# Patient Record
Sex: FEMALE | Race: OTHER | URBAN - METROPOLITAN AREA
[De-identification: names, ages, dates, MRNs, and addresses within clinical notes are randomized per-mention and may not be internally consistent; named-entity substitution may affect disease eponyms.]

---

## 2020-02-20 ENCOUNTER — OFFICE VISIT (OUTPATIENT)
Dept: FAMILY MEDICINE CLINIC | Age: 40
End: 2020-02-20

## 2020-02-20 ENCOUNTER — HOSPITAL ENCOUNTER (OUTPATIENT)
Dept: LAB | Age: 40
Discharge: HOME | End: 2020-02-20

## 2020-02-20 VITALS
DIASTOLIC BLOOD PRESSURE: 72 MMHG | OXYGEN SATURATION: 100 % | BODY MASS INDEX: 23.59 KG/M2 | HEART RATE: 65 BPM | WEIGHT: 128.2 LBS | TEMPERATURE: 98.6 F | HEIGHT: 62 IN | SYSTOLIC BLOOD PRESSURE: 121 MMHG

## 2020-02-20 DIAGNOSIS — K29.70 GASTRITIS WITHOUT BLEEDING, UNSPECIFIED CHRONICITY, UNSPECIFIED GASTRITIS TYPE: ICD-10-CM

## 2020-02-20 DIAGNOSIS — L50.9 URTICARIA: ICD-10-CM

## 2020-02-20 DIAGNOSIS — L50.9 URTICARIA: Primary | ICD-10-CM

## 2020-02-20 DIAGNOSIS — Z13.9 ENCOUNTER FOR SCREENING: ICD-10-CM

## 2020-02-20 LAB
ALBUMIN SERPL-MCNC: 4.2 G/DL (ref 3.5–5)
ALBUMIN/GLOB SERPL: 1.3 {RATIO} (ref 1.1–2.2)
ALP SERPL-CCNC: 74 U/L (ref 45–117)
ALT SERPL-CCNC: 16 U/L (ref 12–78)
ANION GAP SERPL CALC-SCNC: 3 MMOL/L (ref 5–15)
AST SERPL-CCNC: 16 U/L (ref 15–37)
BILIRUB SERPL-MCNC: 0.5 MG/DL (ref 0.2–1)
BUN SERPL-MCNC: 11 MG/DL (ref 6–20)
BUN/CREAT SERPL: 18 (ref 12–20)
CALCIUM SERPL-MCNC: 9.2 MG/DL (ref 8.5–10.1)
CHLORIDE SERPL-SCNC: 105 MMOL/L (ref 97–108)
CO2 SERPL-SCNC: 28 MMOL/L (ref 21–32)
CREAT SERPL-MCNC: 0.61 MG/DL (ref 0.55–1.02)
GLOBULIN SER CALC-MCNC: 3.3 G/DL (ref 2–4)
GLUCOSE POC: NORMAL MG/DL
GLUCOSE SERPL-MCNC: 77 MG/DL (ref 65–100)
HGB BLD-MCNC: 12.6 G/DL
POTASSIUM SERPL-SCNC: 3.9 MMOL/L (ref 3.5–5.1)
PROT SERPL-MCNC: 7.5 G/DL (ref 6.4–8.2)
SODIUM SERPL-SCNC: 136 MMOL/L (ref 136–145)

## 2020-02-20 PROCEDURE — 80053 COMPREHEN METABOLIC PANEL: CPT

## 2020-02-20 RX ORDER — HYDROXYZINE PAMOATE 25 MG/1
25 CAPSULE ORAL
Qty: 42 CAP | Refills: 0 | Status: SHIPPED | OUTPATIENT
Start: 2020-02-20 | End: 2020-03-05

## 2020-02-20 NOTE — PROGRESS NOTES
At discharge station AVS was printed and reviewed with pt with Eric Nayak as . Reviewed rx script  and told pt rx should be ready for  at pharmacy in approximately 2 hrs. Pt given Good RX  card today  and coupons for prescriptions. Provided pt with screwtop container and explained how to collect stool for HyPylori. Explained that if unable to return it the same day that it must be refrigerated. Told pt that the specimen must be returned within 2 days of collection. Also told them to make sure to put name, date and time of collection on the container. Pt agreed to have my chart text message sent to their phone. I explained to pt that they will need to create a user name, password, chose a security question and then also enter their email. I gave brochure about my chart to pt and asked them to please sign up within 24 hours or code expires. Reviewed and discussed the following as written in provider check out note copied below:Madhav cortez     Check-out Note: Atarax tid for urticaria for 2 weeks.  Will call if labs indicate additional treatment needed.  Often causes drowsiness.  Check stool for h pylori as she has previously been treated and is starting with similar symptoms.  Niru Caldwell, RN

## 2020-02-20 NOTE — PROGRESS NOTES
Assessment/Plan:   Diagnoses and all orders for this visit:    1. Urticaria  -     hydrOXYzine pamoate (VISTARIL) 25 mg capsule; Take 1 Cap by mouth three (3) times daily as needed for Itching for up to 14 days.  -     METABOLIC PANEL, COMPREHENSIVE; Future    2. Encounter for screening  -     AMB POC GLUCOSE BLOOD, BY GLUCOSE MONITORING DEVICE  -     AMB POC HEMOGLOBIN (HGB)    3. Gastritis without bleeding, unspecified chronicity, unspecified gastritis type  -     H PYLORI AG, STOOL; Future      Follow-up and Dispositions    · Return if symptoms worsen or fail to improve. [address]  Subjective:   Bartolo Younger is a 46 y.o. female. Chief Complaint   Patient presents with    Allergic Reaction     all over body    Breast pain     Right     History of Present Illness: Allergic reaction -2 weeks ago started itching all over; when she scratches it gets more red. Also itching in the head. Also itching bottom of eyelids. Itches more in the afternoon. No new products or environment. No history of eczema. Right breast pain x 1 day, not today. Review of Systems: Negative for: fever, chest pain, shortness of breath, leg swelling. Social History: She  reports that she has never smoked. She has never used smokeless tobacco. She reports previous alcohol use. She reports that she does not use drugs. Current Medications:   Current Outpatient Medications   Medication Sig    hydrOXYzine pamoate (VISTARIL) 25 mg capsule Take 1 Cap by mouth three (3) times daily as needed for Itching for up to 14 days. No current facility-administered medications for this visit.         Objective:     Visit Vitals  /72 (BP 1 Location: Right arm)   Pulse 65   Temp 98.6 °F (37 °C) (Temporal)   Ht 5' 2.4\" (1.585 m)   Wt 128 lb 3.2 oz (58.2 kg)   LMP 01/27/2020   SpO2 100%   BMI 23.15 kg/m²      Wt Readings from Last 2 Encounters:   02/20/20 128 lb 3.2 oz (58.2 kg) Lab Review:  Results for orders placed or performed in visit on 02/20/20   AMB POC GLUCOSE BLOOD, BY GLUCOSE MONITORING DEVICE   Result Value Ref Range    Glucose POC NF 94 mg/dL   AMB POC HEMOGLOBIN (HGB)   Result Value Ref Range    Hemoglobin (POC) 12.6       Physical Examination: erythematous, maculopapular rash of upper right extremity and lower left leg in popliteal fossa. General appearance - well developed, no acute distress. Chest - clear to auscultation. Heart - regular rate and rhythm without murmurs, rubs, or gallops. Abdomen - bowel sounds present x 4, NT, ND  Extremities - distal sensation intact, no CCE. Assessment/Plan:   Diagnoses and all orders for this visit:    1. Urticaria  -     hydrOXYzine pamoate (VISTARIL) 25 mg capsule; Take 1 Cap by mouth three (3) times daily as needed for Itching for up to 14 days.  -     METABOLIC PANEL, COMPREHENSIVE; Future    2. Encounter for screening  -     AMB POC GLUCOSE BLOOD, BY GLUCOSE MONITORING DEVICE  -     AMB POC HEMOGLOBIN (HGB)    3. Gastritis without bleeding, unspecified chronicity, unspecified gastritis type  -     H PYLORI AG, STOOL; Future      Jah Roman, MSN, RN, FNP-BC, BC-ADM  Eddie Cutler expressed understanding of this plan. Ate pork Saturday evening and Sunday 6 am had pain in breast that went to the right shoulder. Denies stomach pain. Denies diarrhea. Previously treated for H Pylori. Would like to be rechecked.

## 2020-02-20 NOTE — PROGRESS NOTES
Results for orders placed or performed in visit on 02/20/20   AMB POC GLUCOSE BLOOD, BY GLUCOSE MONITORING DEVICE   Result Value Ref Range    Glucose POC NF 94 mg/dL   AMB POC HEMOGLOBIN (HGB)   Result Value Ref Range    Hemoglobin (POC) 12.6

## 2020-02-24 DIAGNOSIS — K29.70 GASTRITIS WITHOUT BLEEDING, UNSPECIFIED CHRONICITY, UNSPECIFIED GASTRITIS TYPE: Primary | ICD-10-CM

## 2020-02-25 ENCOUNTER — HOSPITAL ENCOUNTER (OUTPATIENT)
Dept: LAB | Age: 40
Discharge: HOME | End: 2020-02-25

## 2020-02-25 ENCOUNTER — CLINICAL SUPPORT (OUTPATIENT)
Dept: FAMILY MEDICINE CLINIC | Age: 40
End: 2020-02-25

## 2020-02-25 ENCOUNTER — TELEPHONE (OUTPATIENT)
Dept: FAMILY MEDICINE CLINIC | Age: 40
End: 2020-02-25

## 2020-02-25 DIAGNOSIS — Z13.9 ENCOUNTER FOR SCREENING: Primary | ICD-10-CM

## 2020-02-25 DIAGNOSIS — K29.70 GASTRITIS WITHOUT BLEEDING, UNSPECIFIED CHRONICITY, UNSPECIFIED GASTRITIS TYPE: ICD-10-CM

## 2020-02-25 LAB
HCG URINE, QL. (POC): NEGATIVE
VALID INTERNAL CONTROL?: YES

## 2020-02-25 PROCEDURE — 87338 HPYLORI STOOL AG IA: CPT

## 2020-02-25 NOTE — PROGRESS NOTES
During patient's visit she asked to be checked for H Pylori. I have ordered the H Pylori stool testing and have asked her to return as a nurse visit to have the test explained. Diagnoses and all orders for this visit:    1.  Gastritis without bleeding, unspecified chronicity, unspecified gastritis type  -     H PYLORI AG, STOOL; Future

## 2020-02-25 NOTE — TELEPHONE ENCOUNTER
----- Message from Krishan Garzon NP sent at 2/24/2020  7:04 PM EST -----  Regarding: Return for nurse appointment to do H Pylori stool antigen testing  Please have patient return to see a nurse to explain the stool testing for H Pylori.   I have ordered this test.  Thank you,  Danielle Salcedo

## 2020-02-25 NOTE — PROGRESS NOTES
Chart reviewed. Patient needs pilory testing supplies and instructions. Once nurse was talking to the patient, patient tells me that she was already given the instructions and supplies for test and has specimen with her. Patient directed to lab area, for collection.  Patient is also requesting a pregnancy test.

## 2020-02-25 NOTE — PROGRESS NOTES
Results for orders placed or performed in visit on 02/25/20   AMB POC URINE PREGNANCY TEST, VISUAL COLOR COMPARISON   Result Value Ref Range    VALID INTERNAL CONTROL POC Yes     HCG urine, Ql. (POC) Negative Negative     Pt was here today to drop off Hpylori stool sample provided on 2/25 9:25am

## 2020-02-28 LAB
H PYLORI AG STL QL IA: NEGATIVE
SPECIMEN SOURCE: NORMAL

## 2020-02-28 NOTE — PROGRESS NOTES
H Pylori test is negative. No further treatment needed. Freeman prueba de H Pylori es negativa. No necesita tratamiento para esta. Dra. Tianna Saeed

## 2020-07-01 ENCOUNTER — OFFICE VISIT (OUTPATIENT)
Dept: FAMILY MEDICINE CLINIC | Age: 40
End: 2020-07-01

## 2020-07-01 DIAGNOSIS — L50.9 URTICARIA: Primary | ICD-10-CM

## 2020-07-01 DIAGNOSIS — B34.2 CORONAVIRUS INFECTION: ICD-10-CM

## 2020-07-01 RX ORDER — HYDROXYZINE PAMOATE 25 MG/1
25 CAPSULE ORAL 2 TIMES DAILY
Qty: 60 CAP | Refills: 4 | Status: SHIPPED | OUTPATIENT
Start: 2020-07-01

## 2020-07-01 NOTE — PROGRESS NOTES
Coordination of Care  1. Have you been to the ER, urgent care clinic since your last visit? Hospitalized since your last visit? Yes [facility]: [facility]    2. Have you seen or consulted any other health care providers outside of the [facility] since your last visit? Include any pap smears or colon screening. No    Does the patient need refills? N/A    Learning Assessment Complete?  yes

## 2020-07-01 NOTE — LETTER
[address] [address] [address] [place] [id number] [phone] Work/School Note Date: 7/1/2020 To Whom It May concern: 
 
Zachery Reina was seen and treated today. Zachery Reina is excused from work/school 6/17/20 through 7/5/20 She is medically clear to return to work/school on 7/6/20. Sincerely, Paige Rousseau MD

## 2020-07-01 NOTE — PROGRESS NOTES
HISTORY OF PRESENT ILLNESS  Geovanny Sanders is a 45 y.o. female. HPI  I was at home while conducting this encounter. Consent:  She and/or her healthcare decision maker is aware that this patient-initiated Telehealth encounter is a billable service, with coverage as determined by her insurance carrier. She is aware that she may receive a bill and has provided verbal consent to proceed: Yes    This virtual visit was conducted telephone encounter only. -  I affirm this is a Patient Initiated Episode with an Established Patient who has not had a related appointment within my department in the past 7 days or scheduled within the next 24 hours. Note: this encounter is not billable if this call serves to triage the patient into an appointment for the relevant concern. Total Time: minutes: 11-20 minutes. Patient states  6/11/20 her daughter was sent to have test because a coworker was positive. She went to test 6/12/20. The result was positive. Then she went to have the test done too so went to have it done 6/19/20. She received a positive results 6/23/20. She needs to stay home until 7/3/20. Patient states She is feeling well. Patient states she had developed a rash,  States about 5 months ago she came to the care a [facility] and had labs. She was given a medication for allergy,  It helps for itching. Would like to get a refill. ROS    Physical Exam    ASSESSMENT and PLAN  Diagnoses and all orders for this visit:    1. Urticaria  -     hydrOXYzine pamoate (VISTARIL) 25 mg capsule; Take 1 Cap by mouth two (2) times a day.     2. Coronavirus infection    Other orders  -     DROPLET PLUS; Standing      We will refill her hydroxyzine  Excuse letter written we will send to the portal  Follow up as needed

## 2021-02-22 ENCOUNTER — OFFICE VISIT (OUTPATIENT)
Dept: FAMILY MEDICINE CLINIC | Age: 41
End: 2021-02-22

## 2021-02-22 ENCOUNTER — HOSPITAL ENCOUNTER (OUTPATIENT)
Dept: LAB | Age: 41
Discharge: HOME | End: 2021-02-22

## 2021-02-22 VITALS
WEIGHT: 135 LBS | HEART RATE: 73 BPM | SYSTOLIC BLOOD PRESSURE: 113 MMHG | HEIGHT: 63 IN | DIASTOLIC BLOOD PRESSURE: 72 MMHG | BODY MASS INDEX: 23.92 KG/M2 | TEMPERATURE: 98.4 F

## 2021-02-22 DIAGNOSIS — L50.9 URTICARIA: Primary | ICD-10-CM

## 2021-02-22 DIAGNOSIS — L50.9 URTICARIA: ICD-10-CM

## 2021-02-22 PROCEDURE — 99213 OFFICE O/P EST LOW 20 MIN: CPT | Performed by: FAMILY MEDICINE

## 2021-02-22 RX ORDER — TRIAMCINOLONE ACETONIDE 1 MG/G
CREAM TOPICAL 2 TIMES DAILY
Qty: 454 G | Refills: 3 | Status: SHIPPED | OUTPATIENT
Start: 2021-02-22

## 2021-02-22 RX ORDER — FAMOTIDINE 40 MG/1
40 TABLET, FILM COATED ORAL DAILY
Qty: 90 TAB | Refills: 3 | Status: SHIPPED | OUTPATIENT
Start: 2021-02-22

## 2021-02-22 RX ORDER — CETIRIZINE HCL 10 MG
10 TABLET ORAL DAILY
Qty: 90 TAB | Refills: 3 | Status: SHIPPED | OUTPATIENT
Start: 2021-02-22

## 2021-02-22 NOTE — PROGRESS NOTES
HISTORY OF PRESENT ILLNESS  Marianela Fuller is a 45 y.o. female. HPI  Patient sates she has had allergies for about 2 years. She was prescribed hydroxyzine initially. She would like to know if the rash goes away without need of medication. The medication makes her sleepy. If she doesn't take it she gets the rash and is very itchy. Patient uses dove soap. She went to a dermatologist who also diagnosed her with urticaria and continue hydroxyzine as needed  Review of Systems   Constitutional: Negative for chills, fever and weight loss. HENT: Negative for hearing loss and tinnitus. Eyes: Negative for blurred vision. Respiratory: Negative for cough, hemoptysis and sputum production. Cardiovascular: Negative for chest pain, palpitations and orthopnea. Gastrointestinal: Negative for heartburn, nausea and vomiting. Genitourinary: Negative for dysuria, frequency and urgency. Musculoskeletal: Negative for back pain, myalgias and neck pain. Skin: Positive for itching and rash. /72 (BP 1 Location: Right arm, BP Patient Position: Sitting)   Pulse 73   Temp 98.4 °F (36.9 °C) (Temporal)   Ht 5' 3.23\" (1.606 m)   Wt 135 lb (61.2 kg)   LMP 02/07/2021   BMI 23.74 kg/m²   Physical Exam  Constitutional:       General: She is not in acute distress. Appearance: She is not ill-appearing. HENT:      Right Ear: Tympanic membrane normal.      Left Ear: Tympanic membrane normal.      Nose: Nose normal. No congestion or rhinorrhea. Mouth/Throat:      Mouth: Mucous membranes are moist.      Pharynx: No oropharyngeal exudate. Eyes:      General:         Right eye: No discharge. Left eye: No discharge. Extraocular Movements: Extraocular movements intact. Conjunctiva/sclera: Conjunctivae normal.   Neck:      Musculoskeletal: Normal range of motion. No neck rigidity. Cardiovascular:      Rate and Rhythm: Normal rate. Pulses: Normal pulses.       Heart sounds: Normal heart sounds. No murmur. Pulmonary:      Effort: Pulmonary effort is normal. No respiratory distress. Breath sounds: No wheezing or rhonchi. Abdominal:      General: Bowel sounds are normal. There is no distension. Palpations: Abdomen is soft. Tenderness: There is no abdominal tenderness. Hernia: No hernia is present. Musculoskeletal: Normal range of motion. General: No swelling or tenderness. Neurological:      Mental Status: She is alert. ASSESSMENT and PLAN  Diagnoses and all orders for this visit:    1. Urticaria  -     famotidine (PEPCID) 40 mg tablet; Take 1 Tab by mouth daily. -     cetirizine (ZYRTEC) 10 mg tablet; Take 1 Tab by mouth daily. -     triamcinolone acetonide (KENALOG) 0.1 % topical cream; Apply  to affected area two (2) times a day. Rash on arms and legs  -     ALLERGEN, DOG DANDER; Future  -     ALLERGEN, BERMUDA GRASS AB, IGE, QT; Future  -     ALLERGEN, DANDELION AB, IGE, QT; Future  -     ALLERGEN, DERMATOPHAGOIDES FARINAE AB, IGE, QT; Future  -     ALLERGEN, DERMATOPHAGOIDES PTERONYSSINUS AB, IGE, QT; Future  -     ALLERGEN, [place] BLUEGRASS AB, IGE, QT; Future  -     CBC WITH AUTOMATED DIFF; Future  -     METABOLIC PANEL, COMPREHENSIVE; Future      45year old female with urticaria, would like to know if we can do an allergen panel,  States she does yardwork and is unaware if she is allergic to grass.    We will change to famotidine/cetirizine and may use kenalog as needed

## 2021-02-23 LAB
ALBUMIN SERPL-MCNC: 3.6 G/DL (ref 3.5–5)
ALBUMIN/GLOB SERPL: 1.1 {RATIO} (ref 1.1–2.2)
ALP SERPL-CCNC: 70 U/L (ref 45–117)
ALT SERPL-CCNC: 12 U/L (ref 12–78)
ANION GAP SERPL CALC-SCNC: 5 MMOL/L (ref 5–15)
AST SERPL-CCNC: 16 U/L (ref 15–37)
BASOPHILS # BLD: 0 K/UL (ref 0–0.1)
BASOPHILS NFR BLD: 0 % (ref 0–1)
BILIRUB SERPL-MCNC: 0.6 MG/DL (ref 0.2–1)
BUN SERPL-MCNC: 8 MG/DL (ref 6–20)
BUN/CREAT SERPL: 13 (ref 12–20)
CALCIUM SERPL-MCNC: 9.2 MG/DL (ref 8.5–10.1)
CHLORIDE SERPL-SCNC: 106 MMOL/L (ref 97–108)
CO2 SERPL-SCNC: 26 MMOL/L (ref 21–32)
CREAT SERPL-MCNC: 0.63 MG/DL (ref 0.55–1.02)
DIFFERENTIAL METHOD BLD: NORMAL
EOSINOPHIL # BLD: 0.2 K/UL (ref 0–0.4)
EOSINOPHIL NFR BLD: 3 % (ref 0–7)
ERYTHROCYTE [DISTWIDTH] IN BLOOD BY AUTOMATED COUNT: 13.2 % (ref 11.5–14.5)
GLOBULIN SER CALC-MCNC: 3.2 G/DL (ref 2–4)
GLUCOSE SERPL-MCNC: 80 MG/DL (ref 65–100)
HCT VFR BLD AUTO: 37.2 % (ref 35–47)
HGB BLD-MCNC: 11.9 G/DL (ref 11.5–16)
IMM GRANULOCYTES # BLD AUTO: 0 K/UL (ref 0–0.04)
IMM GRANULOCYTES NFR BLD AUTO: 0 % (ref 0–0.5)
LYMPHOCYTES # BLD: 2.1 K/UL (ref 0.8–3.5)
LYMPHOCYTES NFR BLD: 36 % (ref 12–49)
MCH RBC QN AUTO: 27.5 PG (ref 26–34)
MCHC RBC AUTO-ENTMCNC: 32 G/DL (ref 30–36.5)
MCV RBC AUTO: 86.1 FL (ref 80–99)
MONOCYTES # BLD: 0.3 K/UL (ref 0–1)
MONOCYTES NFR BLD: 5 % (ref 5–13)
NEUTS SEG # BLD: 3.2 K/UL (ref 1.8–8)
NEUTS SEG NFR BLD: 56 % (ref 32–75)
NRBC # BLD: 0 K/UL (ref 0–0.01)
NRBC BLD-RTO: 0 PER 100 WBC
PLATELET # BLD AUTO: 320 K/UL (ref 150–400)
PMV BLD AUTO: 10.1 FL (ref 8.9–12.9)
POTASSIUM SERPL-SCNC: 4.3 MMOL/L (ref 3.5–5.1)
PROT SERPL-MCNC: 6.8 G/DL (ref 6.4–8.2)
RBC # BLD AUTO: 4.32 M/UL (ref 3.8–5.2)
SODIUM SERPL-SCNC: 137 MMOL/L (ref 136–145)
WBC # BLD AUTO: 5.8 K/UL (ref 3.6–11)

## 2021-02-23 PROCEDURE — 80053 COMPREHEN METABOLIC PANEL: CPT

## 2021-02-23 PROCEDURE — 86003 ALLG SPEC IGE CRUDE XTRC EA: CPT

## 2021-02-23 PROCEDURE — 85025 COMPLETE CBC W/AUTO DIFF WBC: CPT

## 2021-02-24 NOTE — PROGRESS NOTES
Normal kidney function, liver function, electrolytes and blood count  Still waiting for allergen panel  Patient can be informed

## 2021-02-25 LAB
BERMUDA GRASS IGE QN: <0.1 KU/L
D FARINAE IGE QN: 0.25 KU/L
D PTERONYSS IGE QN: 0.15 KU/L
DANDELION IGE QN: <0.1 KU/L
DOG DANDER IGE QN: 0.19 KU/L
KENT BLUE GRASS IGE QN: 1.19 KU/L

## 2021-02-25 NOTE — PROGRESS NOTES
Patient is allergic to dust mites, dog hair, and grass  Patient can be informed  Should avoid carpets and drapes  Needs to avoid cutting grass

## 2021-11-22 ENCOUNTER — TELEPHONE (OUTPATIENT)
Dept: FAMILY MEDICINE CLINIC | Age: 41
End: 2021-11-22

## 2021-11-22 NOTE — TELEPHONE ENCOUNTER
Tc to triage the pt with the assistance of Raoul Cho. The pt called the [address] [place] [address] office for an appt, with complaints of chest pain. Chest pain is in right side of the chest, right arm, right shoulder and neck. Started 2 days ago. It is now stronger. She has denied being in an accident or injured herself. She has been lifing heavy things for about 5 months the pain in right arm started 2 weeks ago. Went to derm for allergies. Takes that medication, but nothing for pain. The pt complains of coughing for 2 weeks. It is strong and does not know if this is caused the pain. Stated she coughs in the morning and because it is cold. Has not been tested for covid. The pt was given the [address] walk in clinic for HD on tomorrow 9-11 am. The pt was also given the appt line # to call for a virtual appt in the AM. The pt was instructed no one with Covid sxs allowed into the [facility] clinic without a negative Covid test documentation. The pt verbalized understanding.   Bria Ernandez RN

## 2023-05-19 RX ORDER — HYDROXYZINE PAMOATE 25 MG/1
25 CAPSULE ORAL 2 TIMES DAILY
COMMUNITY
Start: 2020-07-01

## 2023-05-19 RX ORDER — CETIRIZINE HYDROCHLORIDE 10 MG/1
10 TABLET ORAL DAILY
COMMUNITY
Start: 2021-02-22

## 2023-05-19 RX ORDER — FAMOTIDINE 40 MG/1
40 TABLET, FILM COATED ORAL DAILY
COMMUNITY
Start: 2021-02-22

## 2023-05-19 RX ORDER — TRIAMCINOLONE ACETONIDE 1 MG/G
CREAM TOPICAL 2 TIMES DAILY
COMMUNITY
Start: 2021-02-22